# Patient Record
Sex: FEMALE | Race: OTHER | Employment: FULL TIME | URBAN - METROPOLITAN AREA
[De-identification: names, ages, dates, MRNs, and addresses within clinical notes are randomized per-mention and may not be internally consistent; named-entity substitution may affect disease eponyms.]

---
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
## 2022-05-15 PROCEDURE — 99284 EMERGENCY DEPT VISIT MOD MDM: CPT

## 2022-05-16 ENCOUNTER — HOSPITAL ENCOUNTER (EMERGENCY)
Age: 22
Discharge: HOME | End: 2022-05-16
Attending: EMERGENCY MEDICINE
Payer: COMMERCIAL

## 2022-05-16 VITALS
SYSTOLIC BLOOD PRESSURE: 133 MMHG | DIASTOLIC BLOOD PRESSURE: 89 MMHG | HEART RATE: 86 BPM | RESPIRATION RATE: 16 BRPM | TEMPERATURE: 97.8 F | OXYGEN SATURATION: 100 %
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
## 2022-05-16 PROCEDURE — 6370000000 HC RX 637 (ALT 250 FOR IP): Performed by: NURSE PRACTITIONER

## 2022-05-16 PROCEDURE — 90471 IMMUNIZATION ADMIN: CPT | Performed by: NURSE PRACTITIONER

## 2022-05-16 PROCEDURE — 90715 TDAP VACCINE 7 YRS/> IM: CPT | Performed by: NURSE PRACTITIONER

## 2022-05-16 PROCEDURE — 6360000002 HC RX W HCPCS: Performed by: NURSE PRACTITIONER

## 2022-05-16 RX ORDER — AMOXICILLIN AND CLAVULANATE POTASSIUM 875; 125 MG/1; MG/1
1 TABLET, FILM COATED ORAL 2 TIMES DAILY
Qty: 20 TABLET | Refills: 0 | Status: SHIPPED | OUTPATIENT
Start: 2022-05-16 | End: 2022-05-26

## 2022-05-16 RX ORDER — IBUPROFEN 600 MG/1
600 TABLET ORAL ONCE
Status: COMPLETED | OUTPATIENT
Start: 2022-05-16 | End: 2022-05-16

## 2022-05-16 RX ORDER — IBUPROFEN 600 MG/1
600 TABLET ORAL 4 TIMES DAILY PRN
Qty: 40 TABLET | Refills: 0 | Status: SHIPPED | OUTPATIENT
Start: 2022-05-16

## 2022-05-16 RX ORDER — AMOXICILLIN AND CLAVULANATE POTASSIUM 875; 125 MG/1; MG/1
1 TABLET, FILM COATED ORAL ONCE
Status: COMPLETED | OUTPATIENT
Start: 2022-05-16 | End: 2022-05-16

## 2022-05-16 RX ADMIN — AMOXICILLIN AND CLAVULANATE POTASSIUM 1 TABLET: 875; 125 TABLET, FILM COATED ORAL at 01:07

## 2022-05-16 RX ADMIN — IBUPROFEN 600 MG: 600 TABLET ORAL at 01:07

## 2022-05-16 RX ADMIN — TETANUS TOXOID, REDUCED DIPHTHERIA TOXOID AND ACELLULAR PERTUSSIS VACCINE, ADSORBED 0.5 ML: 5; 2.5; 8; 8; 2.5 SUSPENSION INTRAMUSCULAR at 01:07

## 2022-05-16 ASSESSMENT — ENCOUNTER SYMPTOMS
CHEST TIGHTNESS: 0
VOMITING: 0
DIARRHEA: 0
ABDOMINAL PAIN: 0
NAUSEA: 0
SHORTNESS OF BREATH: 0

## 2022-05-16 ASSESSMENT — PAIN SCALES - GENERAL
PAINLEVEL_OUTOF10: 8
PAINLEVEL_OUTOF10: 8

## 2022-05-16 ASSESSMENT — PAIN - FUNCTIONAL ASSESSMENT: PAIN_FUNCTIONAL_ASSESSMENT: 0-10

## 2022-05-16 NOTE — LETTER
[facility]  [address] [place] [address], [address]             May 16, 2022    To Whom It May Concern:    Suzanne Farley was present in our emergency department on 5/15/2022. She may return to work 5/17/2022.      Sincerely,     [facility]    ________________________________________

## 2022-05-16 NOTE — ED PROVIDER NOTES
[address] [facility]        Pt Name: Zannie Hamman  MRN: [id number]  [place] 2000  Date of evaluation: 5/15/2022  Provider: MIRZA Berkowitz CNP  PCP: Malcom FIELDS  Note Started: 1:46 AM EDT        I have seen and evaluated this patient with my supervising physician pendl    CHIEF COMPLAINT       Chief Complaint   Patient presents with    Hand Injury     pt states that her dogs were fighting and she tried to break them up and her dogs bit her. pt states that last tetanus shot was probably longer than 5 years        HISTORY OF PRESENT ILLNESS   (Location, Timing/Onset, Context/Setting, Quality, Duration, Modifying Factors, Severity, Associated Signs and Symptoms)  Note limiting factors. Chief Complaint: dog bite     Zannie Hamman is a 23 y.o. female who presents to the emergency department with puncture wound to the dorsum of the right thumb base. The patient reports that she got between her 2 dogs and that her paez retriever bit her. There is no bleeding. Uncertain of tetanus status. She does report a throbbing pain, rates as 8 of 10. Reports some paresthesia between the wound and distal tip. Normal movement. Patient is right-hand dominant    Denies any headache, fever, lightheadedness, dizziness, visual disturbances. No chest pain or pressure. No neck or back pain. No shortness of breath, cough, or congestion. No abdominal pain, nausea, vomiting, diarrhea, constipation, or dysuria. No rash. Nursing Notes were all reviewed and agreed with or any disagreements were addressed in the HPI. REVIEW OF SYSTEMS    (2-9 systems for level 4, 10 or more for level 5)     Review of Systems   Constitutional: Negative for activity change, chills and fever. Respiratory: Negative for chest tightness and shortness of breath. Cardiovascular: Negative for chest pain.    Gastrointestinal: Negative for abdominal pain, diarrhea, nausea and vomiting. Genitourinary: Negative for dysuria. Skin: Positive for wound. All other systems reviewed and are negative. Positives and Pertinent negatives as per HPI. Except as noted above in the ROS, all other systems were reviewed and negative. PAST MEDICAL HISTORY   History reviewed. No pertinent past medical history. SURGICAL HISTORY     Past Surgical History:   Procedure Laterality Date    KNEE SURGERY           CURRENTMEDICATIONS       Discharge Medication List as of 5/16/2022  1:10 AM      CONTINUE these medications which have NOT CHANGED    Details   ondansetron (ZOFRAN ODT) 4 MG disintegrating tablet Take 1 tablet by mouth every 8 hours as needed for Nausea, Disp-20 tablet, R-0Print               ALLERGIES     Patient has no known allergies. FAMILYHISTORY     History reviewed. No pertinent family history. SOCIAL HISTORY       Social History     Tobacco Use    Smoking status: Never Smoker    Smokeless tobacco: Never Used   Substance Use Topics    Alcohol use: Yes    Drug use: No       SCREENINGS    [place] Coma Scale  Eye Opening: Spontaneous  Best Verbal Response: Oriented  Best Motor Response: Obeys commands  [place] Coma Scale Score: 15        PHYSICAL EXAM    (up to 7 for level 4, 8 or more for level 5)     ED Triage Vitals   BP Temp Temp Source Pulse Resp SpO2 Height Weight   05/16/22 0033 05/16/22 0036 05/16/22 0033 05/16/22 0033 05/16/22 0033 05/16/22 0036 -- --   133/89 97.8 °F (36.6 °C) Oral 86 16 100 %         Physical Exam  Vitals and nursing note reviewed. Constitutional:       Appearance: She is well-developed. She is not diaphoretic. HENT:      Head: Normocephalic and atraumatic. Right Ear: External ear normal.      Left Ear: External ear normal.   Eyes:      General:         Right eye: No discharge. Left eye: No discharge. Neck:      Vascular: No JVD. Cardiovascular:      Rate and Rhythm: Normal rate and regular rhythm. Pulses: Normal pulses. Heart sounds: Normal heart sounds. Pulmonary:      Effort: Pulmonary effort is normal. No respiratory distress. Breath sounds: Normal breath sounds. Abdominal:      Palpations: Abdomen is soft. Musculoskeletal:         General: Normal range of motion. Cervical back: Normal range of motion and neck supple. Skin:     General: Skin is warm and dry. Coloration: Skin is not pale. Neurological:      Mental Status: She is alert and oriented to person, place, and time. Psychiatric:         Behavior: Behavior normal.         DIAGNOSTIC RESULTS   LABS:    Labs Reviewed - No data to display    When ordered only abnormal lab results are displayed. All other labs were within normal range or not returned as of this dictation. EKG: When ordered, EKG's are interpreted by the Emergency Department Physician in the absence of a cardiologist.  Please see their note for interpretation of EKG. RADIOLOGY:   Non-plain film images such as CT, Ultrasound and MRI are read by the radiologist. Plain radiographic images are visualized and preliminarily interpreted by the ED Provider with the below findings:        Interpretation per the Radiologist below, if available at the time of this note:    No orders to display     No results found. PROCEDURES   Unless otherwise noted below, none     Lac Repair    Date/Time: 5/16/2022 1:48 AM  Performed by: MIRZA Varma - CNP  Authorized by: Alexis España DO     Consent:     Consent obtained:  Verbal    Consent given by:  Patient    Risks discussed:  Infection, pain, poor cosmetic result and poor wound healing  Anesthesia (see MAR for exact dosages):      Anesthesia method:  Nerve block    Block needle gauge:  27 G    Block anesthetic:  Lidocaine 1% w/o epi    Block injection procedure:  Introduced needle, anatomic landmarks identified, incremental injection, negative aspiration for blood and anatomic landmarks palpated    Block outcome:  Anesthesia achieved  Laceration details:     Location:  Hand    Hand location:  R hand, dorsum    Length (cm):  0.5  Repair type:     Repair type:  Simple  Exploration:     Contaminated: no    Treatment:     Area cleansed with:  Hibiclens    Amount of cleaning:  Extensive    Irrigation solution:  Sterile saline    Irrigation volume:  1000    Irrigation method:  Pressure wash    Visualized foreign bodies/material removed: no    Skin repair:     Repair method: open. Post-procedure details:     Dressing:  Antibiotic ointment and bulky dressing    Patient tolerance of procedure: Tolerated well, no immediate complications  Comments:      Wound not closed due to dog bite, small wound        CRITICAL CARE TIME       CONSULTS:  None      EMERGENCY DEPARTMENT COURSE and DIFFERENTIAL DIAGNOSIS/MDM:   Vitals:    Vitals:    05/16/22 0033 05/16/22 0036   BP: 133/89    Pulse: 86    Resp: 16    Temp:  97.8 °F (36.6 °C)   TempSrc: Oral    SpO2:  100%       Patient was given the following medications:  Medications   amoxicillin-clavulanate (AUGMENTIN) 875-125 MG per tablet 1 tablet (1 tablet Oral Given 5/16/22 0107)   ibuprofen (ADVIL;MOTRIN) tablet 600 mg (600 mg Oral Given 5/16/22 0107)   Tetanus-Diphth-Acell Pertussis (BOOSTRIX) injection 0.5 mL (0.5 mLs IntraMUSCular Given 5/16/22 0107)         Is this patient to be included in the SEP-1 Core Measure due to severe sepsis or septic shock? No   Exclusion criteria - the patient is NOT to be included for SEP-1 Core Measure due to: Infection is not suspected    Briefly, this is a 23year old female who presents to the emergency department with puncture wound to the right hand. The patient was trying to break up a fight between her fully vaccinated dogs. The wound was anesthetized and irrigated with 1 L of normal saline. The patient was placed in a sterile dressing and started on antibiotics, Augmentin, first dose given in the ER. Tetanus updated. She is given a referral to hand surgery in case subjective paresthesia does not resolve. I estimate there is LOW risk for COMPARTMENT SYNDROME, TENDON OR NEUROVASCULAR INJURY, FOREIGN BODY OR signs of INFECTION thus I consider the discharge disposition reasonable. FINAL IMPRESSION      1.  Dog bite, initial encounter          DISPOSITION/PLAN   DISPOSITION Decision To Discharge 05/16/2022 12:58:00 AM      PATIENT REFERRED TO:  Meng Luna MD  [facility], [address] [place] [address],[address]  [address] [facility]  [phone]    Schedule an appointment as soon as possible for a visit         DISCHARGE MEDICATIONS:  Discharge Medication List as of 5/16/2022  1:10 AM      START taking these medications    Details   amoxicillin-clavulanate (AUGMENTIN) 875-125 MG per tablet Take 1 tablet by mouth 2 times daily for 10 days, Disp-20 tablet, R-0Normal      ibuprofen (ADVIL;MOTRIN) 600 MG tablet Take 1 tablet by mouth 4 times daily as needed for Pain, Disp-40 tablet, R-0Normal             DISCONTINUED MEDICATIONS:  Discharge Medication List as of 5/16/2022  1:10 AM                 (Please note that portions of this note were completed with a voice recognition program.  Efforts were made to edit the dictations but occasionally words are mis-transcribed.)    MIRZA Velasco CNP (electronically signed)            MIRZA Velasco CNP  05/16/22 0209

## 2022-05-16 NOTE — ED PROVIDER NOTES
In addition to the advanced practice provider, I personally saw Sergio Ma and performed a substantive portion of the visit including all aspects of the medical decision making. Briefly, this is a 23 y.o. female here for dog bite to her right hand. Patient states she was trying to break up a fight between 2 dogs owned by a friend when the dog bit her on the hand. Friend is present, states the dogs are both up-to-date on their vaccinations. .    On exam, patient afebrile and nontoxic. No distress. Heart RRR. Lungs CTAB. 1 cm laceration overlying proximal aspect of posterior right thumb. Punctate lacerations overlying right thenar eminence. Radial 2+. Distal capillary refill of fingers of right hand is brisk. Sensation intact. Patient reports difficulty with abduction and flexion of her right thumb, no other range of motion loss. Screenings   [place] Coma Scale  Eye Opening: Spontaneous  Best Verbal Response: Oriented  Best Motor Response: Obeys commands  [place] Coma Scale Score: 15      Is this patient to be included in the SEP-1 Core Measure due to severe sepsis or septic shock? No   Exclusion criteria - the patient is NOT to be included for SEP-1 Core Measure due to: Infection is not suspected      MDM    Patient afebrile and nontoxic. No distress. Lacerations to right hand from bite injury, discussed my recommendation against primary repair given risk of infection and patient verbalized understanding and is agreeable. Wounds were copiously irrigated, appear superficial and without evidence of foreign body. No evidence of vascular or nerve injury. Patient does report difficulty with moving her right thumb which she attributes to pain, given location of laceration, suspicion for tendinous laceration is very low, however will refer for hand follow-up if range of motion issues persist.  Tetanus updated. Will start on Augmentin. No indication for rabies prophylaxis.   Patient is agreeable with plan and feels comfortable returning to home. Strict return precautions including warning signs of wound infection were discussed. Patient Referrals:  Peyton Das MD  [facility] [address] [facility] [address]  [address]  [phone]    Schedule an appointment as soon as possible for a visit         Discharge Medications:  Discharge Medication List as of 5/16/2022  1:10 AM      START taking these medications    Details   amoxicillin-clavulanate (AUGMENTIN) 875-125 MG per tablet Take 1 tablet by mouth 2 times daily for 10 days, Disp-20 tablet, R-0Normal      ibuprofen (ADVIL;MOTRIN) 600 MG tablet Take 1 tablet by mouth 4 times daily as needed for Pain, Disp-40 tablet, R-0Normal             FINAL IMPRESSION  1. Dog bite, initial encounter        Blood pressure 133/89, pulse 86, temperature 97.8 °F (36.6 °C), resp. rate 16, last menstrual period 04/25/2022, SpO2 100 %. For further details of [facility] emergency department encounter, please see documentation by advanced practice provider, Obed Mazariegos NP.     Deangelo Simons DO (electronically signed)  Attending Emergency Physician       eDangelo Simons DO  05/16/22 1213

## 2022-05-16 NOTE — Clinical Note
Tyrus Ganser was seen and treated in our emergency department on 5/15/2022. She may return to work on 05/17/2022. If you have any questions or concerns, please don't hesitate to call.       Bhaskar Lomax, DO

## 2022-05-17 ENCOUNTER — TELEPHONE (OUTPATIENT)
Dept: ORTHOPEDIC SURGERY | Age: 22
End: 2022-05-17

## 2022-05-17 NOTE — TELEPHONE ENCOUNTER
Other CONTACTED PATIENT TO SCHEDULE APPOINTMENT WITH YAEL PHELPS. LEFT MESSAGE WITH PATIENT'S MOTHER TO HAVE PATIENT CALL BACK. PLEASE SCHEDULE UPON RETURN CALL.

## 2022-05-18 ENCOUNTER — TELEPHONE (OUTPATIENT)
Dept: ORTHOPEDIC SURGERY | Age: 22
End: 2022-05-18

## 2022-05-20 ENCOUNTER — TELEPHONE (OUTPATIENT)
Dept: ORTHOPEDIC SURGERY | Age: 22
End: 2022-05-20